# Patient Record
Sex: FEMALE | Race: WHITE | NOT HISPANIC OR LATINO
[De-identification: names, ages, dates, MRNs, and addresses within clinical notes are randomized per-mention and may not be internally consistent; named-entity substitution may affect disease eponyms.]

---

## 2018-09-21 PROBLEM — Z00.00 ENCOUNTER FOR PREVENTIVE HEALTH EXAMINATION: Status: ACTIVE | Noted: 2018-09-21

## 2018-09-24 ENCOUNTER — APPOINTMENT (OUTPATIENT)
Dept: COLORECTAL SURGERY | Facility: CLINIC | Age: 78
End: 2018-09-24
Payer: MEDICARE

## 2018-09-24 VITALS
DIASTOLIC BLOOD PRESSURE: 76 MMHG | HEIGHT: 65 IN | TEMPERATURE: 97.5 F | HEART RATE: 88 BPM | SYSTOLIC BLOOD PRESSURE: 157 MMHG | BODY MASS INDEX: 22.99 KG/M2 | WEIGHT: 138 LBS

## 2018-09-24 DIAGNOSIS — Z80.0 FAMILY HISTORY OF MALIGNANT NEOPLASM OF DIGESTIVE ORGANS: ICD-10-CM

## 2018-09-24 DIAGNOSIS — Z82.49 FAMILY HISTORY OF ISCHEMIC HEART DISEASE AND OTHER DISEASES OF THE CIRCULATORY SYSTEM: ICD-10-CM

## 2018-09-24 DIAGNOSIS — E78.00 PURE HYPERCHOLESTEROLEMIA, UNSPECIFIED: ICD-10-CM

## 2018-09-24 DIAGNOSIS — I10 ESSENTIAL (PRIMARY) HYPERTENSION: ICD-10-CM

## 2018-09-24 DIAGNOSIS — Z01.818 ENCOUNTER FOR OTHER PREPROCEDURAL EXAMINATION: ICD-10-CM

## 2018-09-24 DIAGNOSIS — C43.60 MALIGNANT MELANOMA OF UNSPECIFIED UPPER LIMB, INCLUDING SHOULDER: ICD-10-CM

## 2018-09-24 DIAGNOSIS — Z78.9 OTHER SPECIFIED HEALTH STATUS: ICD-10-CM

## 2018-09-24 PROCEDURE — 99205 OFFICE O/P NEW HI 60 MIN: CPT

## 2018-09-24 RX ORDER — SIMVASTATIN 20 MG/1
20 TABLET, FILM COATED ORAL
Refills: 0 | Status: ACTIVE | COMMUNITY

## 2018-09-24 RX ORDER — RAMIPRIL 5 MG/1
5 CAPSULE ORAL
Refills: 0 | Status: ACTIVE | COMMUNITY

## 2018-09-24 RX ORDER — METOPROLOL SUCCINATE 100 MG/1
100 TABLET, EXTENDED RELEASE ORAL
Refills: 0 | Status: ACTIVE | COMMUNITY

## 2018-09-25 LAB
ALBUMIN SERPL ELPH-MCNC: 4.8 G/DL
ALP BLD-CCNC: 84 U/L
ALT SERPL-CCNC: 25 U/L
ANION GAP SERPL CALC-SCNC: 19 MMOL/L
AST SERPL-CCNC: 37 U/L
BILIRUB SERPL-MCNC: 0.5 MG/DL
BUN SERPL-MCNC: 15 MG/DL
CALCIUM SERPL-MCNC: 10.1 MG/DL
CHLORIDE SERPL-SCNC: 102 MMOL/L
CO2 SERPL-SCNC: 24 MMOL/L
CREAT SERPL-MCNC: 0.83 MG/DL
GLUCOSE SERPL-MCNC: 29 MG/DL
POTASSIUM SERPL-SCNC: 3.8 MMOL/L
PROT SERPL-MCNC: 7.9 G/DL
SODIUM SERPL-SCNC: 145 MMOL/L

## 2018-09-27 ENCOUNTER — RESULT REVIEW (OUTPATIENT)
Age: 78
End: 2018-09-27

## 2018-09-27 ENCOUNTER — OUTPATIENT (OUTPATIENT)
Dept: OUTPATIENT SERVICES | Facility: HOSPITAL | Age: 78
LOS: 1 days | End: 2018-09-27
Payer: MEDICARE

## 2018-09-27 DIAGNOSIS — K63.5 POLYP OF COLON: ICD-10-CM

## 2018-09-27 PROCEDURE — 88321 CONSLTJ&REPRT SLD PREP ELSWR: CPT

## 2018-10-01 VITALS
OXYGEN SATURATION: 98 % | HEART RATE: 98 BPM | RESPIRATION RATE: 20 BRPM | WEIGHT: 133.82 LBS | TEMPERATURE: 96 F | HEIGHT: 65 IN | SYSTOLIC BLOOD PRESSURE: 172 MMHG | DIASTOLIC BLOOD PRESSURE: 80 MMHG

## 2018-10-01 RX ORDER — INFLUENZA VIRUS VACCINE 15; 15; 15; 15 UG/.5ML; UG/.5ML; UG/.5ML; UG/.5ML
0.5 SUSPENSION INTRAMUSCULAR ONCE
Qty: 0 | Refills: 0 | Status: DISCONTINUED | OUTPATIENT
Start: 2018-10-02 | End: 2018-10-05

## 2018-10-01 NOTE — PRE-OP CHECKLIST - WEIGHT IN KG
How Many Skin Cancers Have You Had?: more than one
What Is The Reason For Today's Visit?: History of Non-Melanoma Skin Cancer
Additional History: Patient here today for full skin exam.
When Was Your Last Cancer Diagnosed?: 2017
60.7

## 2018-10-01 NOTE — PATIENT PROFILE ADULT. - PSH
H/O melanoma excision  left arm with lymph node excision, 2016  H/O melanoma excision  right arm, 1985  H/O total hysterectomy with bilateral salpingo-oophorectomy (BSO)    History of appendectomy    History of cataract surgery  bilateral  History of cystocele  s/p repair  History of laparoscopic cholecystectomy    History of tonsillectomy

## 2018-10-01 NOTE — PATIENT PROFILE ADULT. - PMH
Colon polyp    HLD (hyperlipidemia)    HTN (hypertension)    Melanoma  both arms  Ocular migraine    Thyroid nodule

## 2018-10-02 ENCOUNTER — INPATIENT (INPATIENT)
Facility: HOSPITAL | Age: 78
LOS: 2 days | Discharge: HOME | DRG: 330 | End: 2018-10-05
Attending: SURGERY | Admitting: SURGERY
Payer: MEDICARE

## 2018-10-02 ENCOUNTER — APPOINTMENT (OUTPATIENT)
Dept: COLORECTAL SURGERY | Facility: HOSPITAL | Age: 78
End: 2018-10-02

## 2018-10-02 ENCOUNTER — RESULT REVIEW (OUTPATIENT)
Age: 78
End: 2018-10-02

## 2018-10-02 DIAGNOSIS — Z90.89 ACQUIRED ABSENCE OF OTHER ORGANS: Chronic | ICD-10-CM

## 2018-10-02 DIAGNOSIS — Z90.710 ACQUIRED ABSENCE OF BOTH CERVIX AND UTERUS: Chronic | ICD-10-CM

## 2018-10-02 DIAGNOSIS — Z98.890 OTHER SPECIFIED POSTPROCEDURAL STATES: Chronic | ICD-10-CM

## 2018-10-02 DIAGNOSIS — Z90.49 ACQUIRED ABSENCE OF OTHER SPECIFIED PARTS OF DIGESTIVE TRACT: Chronic | ICD-10-CM

## 2018-10-02 DIAGNOSIS — Z87.448 PERSONAL HISTORY OF OTHER DISEASES OF URINARY SYSTEM: Chronic | ICD-10-CM

## 2018-10-02 DIAGNOSIS — Z98.49 CATARACT EXTRACTION STATUS, UNSPECIFIED EYE: Chronic | ICD-10-CM

## 2018-10-02 LAB
APTT BLD: 29.6 SEC — SIGNIFICANT CHANGE UP (ref 27.5–37.4)
INR BLD: 1.1 — SIGNIFICANT CHANGE UP (ref 0.88–1.16)
PROTHROM AB SERPL-ACNC: 12.2 SEC — SIGNIFICANT CHANGE UP (ref 9.8–12.7)

## 2018-10-02 PROCEDURE — 44205 LAP COLECTOMY PART W/ILEUM: CPT | Mod: 80

## 2018-10-02 PROCEDURE — 44205 LAP COLECTOMY PART W/ILEUM: CPT | Mod: GC

## 2018-10-02 PROCEDURE — 49561: CPT | Mod: GC

## 2018-10-02 PROCEDURE — 49561: CPT | Mod: 80

## 2018-10-02 RX ORDER — RAMIPRIL 5 MG
1 CAPSULE ORAL
Qty: 0 | Refills: 0 | COMMUNITY

## 2018-10-02 RX ORDER — SIMVASTATIN 20 MG/1
20 TABLET, FILM COATED ORAL AT BEDTIME
Qty: 0 | Refills: 0 | Status: DISCONTINUED | OUTPATIENT
Start: 2018-10-02 | End: 2018-10-02

## 2018-10-02 RX ORDER — HEPARIN SODIUM 5000 [USP'U]/ML
5000 INJECTION INTRAVENOUS; SUBCUTANEOUS EVERY 8 HOURS
Qty: 0 | Refills: 0 | Status: DISCONTINUED | OUTPATIENT
Start: 2018-10-02 | End: 2018-10-05

## 2018-10-02 RX ORDER — KETOROLAC TROMETHAMINE 30 MG/ML
15 SYRINGE (ML) INJECTION ONCE
Qty: 0 | Refills: 0 | Status: DISCONTINUED | OUTPATIENT
Start: 2018-10-02 | End: 2018-10-02

## 2018-10-02 RX ORDER — HYDROMORPHONE HYDROCHLORIDE 2 MG/ML
0.5 INJECTION INTRAMUSCULAR; INTRAVENOUS; SUBCUTANEOUS
Qty: 0 | Refills: 0 | Status: DISCONTINUED | OUTPATIENT
Start: 2018-10-02 | End: 2018-10-02

## 2018-10-02 RX ORDER — LISINOPRIL 2.5 MG/1
20 TABLET ORAL DAILY
Qty: 0 | Refills: 0 | Status: DISCONTINUED | OUTPATIENT
Start: 2018-10-02 | End: 2018-10-05

## 2018-10-02 RX ORDER — SIMVASTATIN 20 MG/1
20 TABLET, FILM COATED ORAL AT BEDTIME
Qty: 0 | Refills: 0 | Status: DISCONTINUED | OUTPATIENT
Start: 2018-10-02 | End: 2018-10-05

## 2018-10-02 RX ORDER — SODIUM CHLORIDE 9 MG/ML
1000 INJECTION, SOLUTION INTRAVENOUS
Qty: 0 | Refills: 0 | Status: DISCONTINUED | OUTPATIENT
Start: 2018-10-02 | End: 2018-10-05

## 2018-10-02 RX ORDER — HYDROMORPHONE HYDROCHLORIDE 2 MG/ML
0.5 INJECTION INTRAMUSCULAR; INTRAVENOUS; SUBCUTANEOUS EVERY 4 HOURS
Qty: 0 | Refills: 0 | Status: DISCONTINUED | OUTPATIENT
Start: 2018-10-02 | End: 2018-10-05

## 2018-10-02 RX ORDER — ONDANSETRON 8 MG/1
4 TABLET, FILM COATED ORAL EVERY 6 HOURS
Qty: 0 | Refills: 0 | Status: DISCONTINUED | OUTPATIENT
Start: 2018-10-02 | End: 2018-10-05

## 2018-10-02 RX ORDER — ACETAMINOPHEN 500 MG
1000 TABLET ORAL ONCE
Qty: 0 | Refills: 0 | Status: COMPLETED | OUTPATIENT
Start: 2018-10-02 | End: 2018-10-02

## 2018-10-02 RX ORDER — GABAPENTIN 400 MG/1
300 CAPSULE ORAL ONCE
Qty: 0 | Refills: 0 | Status: COMPLETED | OUTPATIENT
Start: 2018-10-02 | End: 2018-10-02

## 2018-10-02 RX ORDER — LISINOPRIL 2.5 MG/1
20 TABLET ORAL DAILY
Qty: 0 | Refills: 0 | Status: DISCONTINUED | OUTPATIENT
Start: 2018-10-02 | End: 2018-10-02

## 2018-10-02 RX ORDER — CEFOTETAN DISODIUM 1 G
2 VIAL (EA) INJECTION EVERY 12 HOURS
Qty: 0 | Refills: 0 | Status: COMPLETED | OUTPATIENT
Start: 2018-10-02 | End: 2018-10-03

## 2018-10-02 RX ORDER — ACETAMINOPHEN 500 MG
650 TABLET ORAL EVERY 6 HOURS
Qty: 0 | Refills: 0 | Status: DISCONTINUED | OUTPATIENT
Start: 2018-10-02 | End: 2018-10-05

## 2018-10-02 RX ORDER — OXYCODONE AND ACETAMINOPHEN 5; 325 MG/1; MG/1
2 TABLET ORAL EVERY 6 HOURS
Qty: 0 | Refills: 0 | Status: DISCONTINUED | OUTPATIENT
Start: 2018-10-02 | End: 2018-10-05

## 2018-10-02 RX ORDER — METOPROLOL TARTRATE 50 MG
100 TABLET ORAL DAILY
Qty: 0 | Refills: 0 | Status: DISCONTINUED | OUTPATIENT
Start: 2018-10-02 | End: 2018-10-05

## 2018-10-02 RX ORDER — CHOLECALCIFEROL (VITAMIN D3) 125 MCG
1 CAPSULE ORAL
Qty: 0 | Refills: 0 | COMMUNITY

## 2018-10-02 RX ORDER — SIMVASTATIN 20 MG/1
1 TABLET, FILM COATED ORAL
Qty: 0 | Refills: 0 | COMMUNITY

## 2018-10-02 RX ORDER — METOPROLOL TARTRATE 50 MG
100 TABLET ORAL DAILY
Qty: 0 | Refills: 0 | Status: DISCONTINUED | OUTPATIENT
Start: 2018-10-02 | End: 2018-10-02

## 2018-10-02 RX ORDER — ALVIMOPAN 12 MG/1
12 CAPSULE ORAL ONCE
Qty: 0 | Refills: 0 | Status: COMPLETED | OUTPATIENT
Start: 2018-10-02 | End: 2018-10-02

## 2018-10-02 RX ORDER — CHOLECALCIFEROL (VITAMIN D3) 125 MCG
1000 CAPSULE ORAL DAILY
Qty: 0 | Refills: 0 | Status: DISCONTINUED | OUTPATIENT
Start: 2018-10-02 | End: 2018-10-05

## 2018-10-02 RX ORDER — CHOLECALCIFEROL (VITAMIN D3) 125 MCG
1000 CAPSULE ORAL DAILY
Qty: 0 | Refills: 0 | Status: DISCONTINUED | OUTPATIENT
Start: 2018-10-02 | End: 2018-10-02

## 2018-10-02 RX ORDER — HEPARIN SODIUM 5000 [USP'U]/ML
5000 INJECTION INTRAVENOUS; SUBCUTANEOUS ONCE
Qty: 0 | Refills: 0 | Status: COMPLETED | OUTPATIENT
Start: 2018-10-02 | End: 2018-10-02

## 2018-10-02 RX ORDER — KETOROLAC TROMETHAMINE 30 MG/ML
15 SYRINGE (ML) INJECTION EVERY 6 HOURS
Qty: 0 | Refills: 0 | Status: DISCONTINUED | OUTPATIENT
Start: 2018-10-02 | End: 2018-10-05

## 2018-10-02 RX ORDER — ALVIMOPAN 12 MG/1
12 CAPSULE ORAL
Qty: 0 | Refills: 0 | Status: DISCONTINUED | OUTPATIENT
Start: 2018-10-02 | End: 2018-10-05

## 2018-10-02 RX ORDER — METOPROLOL TARTRATE 50 MG
1 TABLET ORAL
Qty: 0 | Refills: 0 | COMMUNITY

## 2018-10-02 RX ADMIN — ALVIMOPAN 12 MILLIGRAM(S): 12 CAPSULE ORAL at 11:42

## 2018-10-02 RX ADMIN — Medication 15 MILLIGRAM(S): at 18:13

## 2018-10-02 RX ADMIN — HYDROMORPHONE HYDROCHLORIDE 0.5 MILLIGRAM(S): 2 INJECTION INTRAMUSCULAR; INTRAVENOUS; SUBCUTANEOUS at 18:29

## 2018-10-02 RX ADMIN — OXYCODONE AND ACETAMINOPHEN 2 TABLET(S): 5; 325 TABLET ORAL at 22:36

## 2018-10-02 RX ADMIN — OXYCODONE AND ACETAMINOPHEN 2 TABLET(S): 5; 325 TABLET ORAL at 23:06

## 2018-10-02 RX ADMIN — Medication 15 MILLIGRAM(S): at 19:07

## 2018-10-02 RX ADMIN — Medication 15 MILLIGRAM(S): at 21:42

## 2018-10-02 RX ADMIN — HEPARIN SODIUM 5000 UNIT(S): 5000 INJECTION INTRAVENOUS; SUBCUTANEOUS at 21:42

## 2018-10-02 RX ADMIN — HEPARIN SODIUM 5000 UNIT(S): 5000 INJECTION INTRAVENOUS; SUBCUTANEOUS at 11:43

## 2018-10-02 RX ADMIN — SODIUM CHLORIDE 40 MILLILITER(S): 9 INJECTION, SOLUTION INTRAVENOUS at 21:24

## 2018-10-02 RX ADMIN — Medication 400 MILLIGRAM(S): at 11:53

## 2018-10-02 RX ADMIN — GABAPENTIN 300 MILLIGRAM(S): 400 CAPSULE ORAL at 11:42

## 2018-10-02 RX ADMIN — HYDROMORPHONE HYDROCHLORIDE 0.5 MILLIGRAM(S): 2 INJECTION INTRAMUSCULAR; INTRAVENOUS; SUBCUTANEOUS at 19:07

## 2018-10-02 RX ADMIN — Medication 1000 MILLIGRAM(S): at 12:08

## 2018-10-02 NOTE — H&P ADULT - FAMILY HISTORY
Father  Still living? Unknown  Family history of colon cancer in father, Age at diagnosis: Age Unknown

## 2018-10-02 NOTE — H&P ADULT - HISTORY OF PRESENT ILLNESS
77F w/ HTN, HLD, h/o melanoma of upper arm, & colon cancer who presented to [facility] 10/2/18 for laparoscopic right hemicolectomy. Ascending colon polyp noted on colonoscopy in 2013 and has been monitored by GI at 6mo, 1y, 2y & 3yrs. Last colonoscopy 5/16/18 (Dr. Boothe) showed moderate sigmoid diverticulosis & recurrent sessile, carpeting adenoma in ascending colon measuring 8mm x 1cm. Path showed tubulovillous adenoma. CT A/P 5/8 notable for stable 6mm liver cyst. PSHx includes KALIN/BSO w/ colopexy w/ mesh for pelvic floor prolapse 1yr ago. 77F w/ HTN, HLD, h/o melanoma of upper arm, & tubulovillous adenoma who presented to [facility] 10/2/18 for laparoscopic right hemicolectomy. Ascending colon polyp noted on colonoscopy in 2013 and has been monitored by GI at 6mo, 1y, 2y & 3yrs. Last colonoscopy 5/16/18 (Dr. Boothe) showed moderate sigmoid diverticulosis & recurrent sessile, carpeting adenoma in ascending colon measuring 8mm x 1cm. Path showed tubulovillous adenoma. CT A/P 5/8 notable for stable 6mm liver cyst. PSHx includes KALIN/BSO w/ colopexy w/ mesh for pelvic floor prolapse 1yr ago. 77F w/ HTN, HLD, h/o melanoma of upper arm, & tubulovillous adenoma who presented to [facility] 10/2/18 for robot-assisted laparoscopic right hemicolectomy. Ascending colon polyp noted on colonoscopy in 2013 and has been monitored by GI at 6mo, 1y, 2y & 3yrs. Last colonoscopy 5/16/18 (Dr. Boothe) showed moderate sigmoid diverticulosis & recurrent sessile, carpeting adenoma in ascending colon measuring 8mm x 1cm. Path showed tubulovillous adenoma. CT A/P 5/8 notable for stable 6mm liver cyst. PSHx includes KALIN/BSO w/ colopexy w/ mesh for pelvic floor prolapse 1yr ago.

## 2018-10-02 NOTE — H&P ADULT - NSHPPHYSICALEXAM_GEN_ALL_CORE
NEURO: A&Ox3, NAD, LERNER.  CV: RRR, S1&S2.   PULM: CTAB, no increased WOB.  ABD: Soft, ND, NT.    EXTR: WWP. No peripheral edema.

## 2018-10-02 NOTE — BRIEF OPERATIVE NOTE - PROCEDURE
<<-----Click on this checkbox to enter Procedure Hemicolectomy, right, robot-assisted, laparoscopic  10/02/2018    Active  DIANDRA

## 2018-10-02 NOTE — H&P ADULT - ASSESSMENT
77F w/ HTN, HLD, h/o melanoma of upper arm, & tumbulovillous adenoma of ascending colon who presented to [facility] 10/2/18 for laparoscopic right hemicolectomy.     -Pain/nausea control PRN  -Home meds  -CLD, LR@40  -Entereg BID x7d  -Boswell to gravity  -Cefotetan x 2 doses post-op  -SQH, SCDs, OOB/A, IS  -AM labs

## 2018-10-02 NOTE — BRIEF OPERATIVE NOTE - OPERATION/FINDINGS
Robot-assisted laparoscopic right hemicolectomy. [In progress] Robot-assisted laparoscopic right hemicolectomy. Upon initial inspection of abdomen, noted <2cm hernia in RLQ (incarcerated omentum easily reduced) & small bowel malrotation (ie, small bowel located lateral to colon & jejunum did not cross midline). Right colon & terminal ileum mobilized medial to lateral. Vessels ligated & mesentery divided using robotic vessel sealer & monopolar electrocautery. Terminal ileum & right colon divided using 60mm robotic stapler blue loads x 2. Primary side-to-side functional end-to-end ileocolic anastomosis created using 60mm robotic stapler blue load x 1 & v-lock suture in 2 layers (full thickness followed by imbricated sutures). ICG verified good perfusion to anastomosis. Specimen removed through RLQ hernia defect. Hemostasis achieved. Fascia closed at 12mm port site & hernia defect.

## 2018-10-03 ENCOUNTER — TRANSCRIPTION ENCOUNTER (OUTPATIENT)
Age: 78
End: 2018-10-03

## 2018-10-03 LAB
ANION GAP SERPL CALC-SCNC: 16 MMOL/L — SIGNIFICANT CHANGE UP (ref 5–17)
BUN SERPL-MCNC: 19 MG/DL — SIGNIFICANT CHANGE UP (ref 7–23)
CALCIUM SERPL-MCNC: 9 MG/DL — SIGNIFICANT CHANGE UP (ref 8.4–10.5)
CHLORIDE SERPL-SCNC: 98 MMOL/L — SIGNIFICANT CHANGE UP (ref 96–108)
CO2 SERPL-SCNC: 22 MMOL/L — SIGNIFICANT CHANGE UP (ref 22–31)
CREAT SERPL-MCNC: 0.88 MG/DL — SIGNIFICANT CHANGE UP (ref 0.5–1.3)
GLUCOSE SERPL-MCNC: 124 MG/DL — HIGH (ref 70–99)
HCT VFR BLD CALC: 33.6 % — LOW (ref 34.5–45)
HGB BLD-MCNC: 11.4 G/DL — LOW (ref 11.5–15.5)
MAGNESIUM SERPL-MCNC: 1.7 MG/DL — SIGNIFICANT CHANGE UP (ref 1.6–2.6)
MCHC RBC-ENTMCNC: 31.1 PG — SIGNIFICANT CHANGE UP (ref 27–34)
MCHC RBC-ENTMCNC: 33.9 G/DL — SIGNIFICANT CHANGE UP (ref 32–36)
MCV RBC AUTO: 91.8 FL — SIGNIFICANT CHANGE UP (ref 80–100)
PHOSPHATE SERPL-MCNC: 4 MG/DL — SIGNIFICANT CHANGE UP (ref 2.5–4.5)
PLATELET # BLD AUTO: 196 K/UL — SIGNIFICANT CHANGE UP (ref 150–400)
POTASSIUM SERPL-MCNC: 3.8 MMOL/L — SIGNIFICANT CHANGE UP (ref 3.5–5.3)
POTASSIUM SERPL-SCNC: 3.8 MMOL/L — SIGNIFICANT CHANGE UP (ref 3.5–5.3)
RBC # BLD: 3.66 M/UL — LOW (ref 3.8–5.2)
RBC # FLD: 13.2 % — SIGNIFICANT CHANGE UP (ref 10.3–16.9)
SODIUM SERPL-SCNC: 136 MMOL/L — SIGNIFICANT CHANGE UP (ref 135–145)
WBC # BLD: 13.8 K/UL — HIGH (ref 3.8–10.5)
WBC # FLD AUTO: 13.8 K/UL — HIGH (ref 3.8–10.5)

## 2018-10-03 RX ORDER — POTASSIUM CHLORIDE 20 MEQ
10 PACKET (EA) ORAL
Qty: 0 | Refills: 0 | Status: COMPLETED | OUTPATIENT
Start: 2018-10-03 | End: 2018-10-03

## 2018-10-03 RX ORDER — MAGNESIUM SULFATE 500 MG/ML
1 VIAL (ML) INJECTION ONCE
Qty: 0 | Refills: 0 | Status: COMPLETED | OUTPATIENT
Start: 2018-10-03 | End: 2018-10-03

## 2018-10-03 RX ADMIN — HEPARIN SODIUM 5000 UNIT(S): 5000 INJECTION INTRAVENOUS; SUBCUTANEOUS at 22:25

## 2018-10-03 RX ADMIN — Medication 15 MILLIGRAM(S): at 23:00

## 2018-10-03 RX ADMIN — OXYCODONE AND ACETAMINOPHEN 2 TABLET(S): 5; 325 TABLET ORAL at 14:59

## 2018-10-03 RX ADMIN — ALVIMOPAN 12 MILLIGRAM(S): 12 CAPSULE ORAL at 17:02

## 2018-10-03 RX ADMIN — HEPARIN SODIUM 5000 UNIT(S): 5000 INJECTION INTRAVENOUS; SUBCUTANEOUS at 05:40

## 2018-10-03 RX ADMIN — HYDROMORPHONE HYDROCHLORIDE 0.5 MILLIGRAM(S): 2 INJECTION INTRAMUSCULAR; INTRAVENOUS; SUBCUTANEOUS at 09:56

## 2018-10-03 RX ADMIN — HEPARIN SODIUM 5000 UNIT(S): 5000 INJECTION INTRAVENOUS; SUBCUTANEOUS at 13:41

## 2018-10-03 RX ADMIN — OXYCODONE AND ACETAMINOPHEN 2 TABLET(S): 5; 325 TABLET ORAL at 14:32

## 2018-10-03 RX ADMIN — OXYCODONE AND ACETAMINOPHEN 2 TABLET(S): 5; 325 TABLET ORAL at 06:10

## 2018-10-03 RX ADMIN — Medication 15 MILLIGRAM(S): at 08:55

## 2018-10-03 RX ADMIN — SIMVASTATIN 20 MILLIGRAM(S): 20 TABLET, FILM COATED ORAL at 22:26

## 2018-10-03 RX ADMIN — HYDROMORPHONE HYDROCHLORIDE 0.5 MILLIGRAM(S): 2 INJECTION INTRAMUSCULAR; INTRAVENOUS; SUBCUTANEOUS at 02:46

## 2018-10-03 RX ADMIN — Medication 15 MILLIGRAM(S): at 22:26

## 2018-10-03 RX ADMIN — ONDANSETRON 4 MILLIGRAM(S): 8 TABLET, FILM COATED ORAL at 11:14

## 2018-10-03 RX ADMIN — Medication 15 MILLIGRAM(S): at 08:40

## 2018-10-03 RX ADMIN — HYDROMORPHONE HYDROCHLORIDE 0.5 MILLIGRAM(S): 2 INJECTION INTRAMUSCULAR; INTRAVENOUS; SUBCUTANEOUS at 23:38

## 2018-10-03 RX ADMIN — Medication 100 MILLIEQUIVALENT(S): at 11:16

## 2018-10-03 RX ADMIN — HYDROMORPHONE HYDROCHLORIDE 0.5 MILLIGRAM(S): 2 INJECTION INTRAMUSCULAR; INTRAVENOUS; SUBCUTANEOUS at 02:31

## 2018-10-03 RX ADMIN — Medication 100 MILLIEQUIVALENT(S): at 08:40

## 2018-10-03 RX ADMIN — ALVIMOPAN 12 MILLIGRAM(S): 12 CAPSULE ORAL at 05:44

## 2018-10-03 RX ADMIN — OXYCODONE AND ACETAMINOPHEN 2 TABLET(S): 5; 325 TABLET ORAL at 05:40

## 2018-10-03 RX ADMIN — Medication 100 GRAM(S): at 08:41

## 2018-10-03 RX ADMIN — Medication 100 GRAM(S): at 17:02

## 2018-10-03 RX ADMIN — HYDROMORPHONE HYDROCHLORIDE 0.5 MILLIGRAM(S): 2 INJECTION INTRAMUSCULAR; INTRAVENOUS; SUBCUTANEOUS at 16:35

## 2018-10-03 RX ADMIN — HYDROMORPHONE HYDROCHLORIDE 0.5 MILLIGRAM(S): 2 INJECTION INTRAMUSCULAR; INTRAVENOUS; SUBCUTANEOUS at 10:25

## 2018-10-03 RX ADMIN — HYDROMORPHONE HYDROCHLORIDE 0.5 MILLIGRAM(S): 2 INJECTION INTRAMUSCULAR; INTRAVENOUS; SUBCUTANEOUS at 16:16

## 2018-10-03 RX ADMIN — Medication 100 GRAM(S): at 05:40

## 2018-10-03 NOTE — DISCHARGE NOTE ADULT - CARE PLAN
Principal Discharge DX:	Colon polyp  Goal:	Recovery  Assessment and plan of treatment:	Follow up with Dr. Gotti in 1 week. Call the office at the number below to schedule your appointment. You may shower; soap and water over incision sites. Do not scrub. Pat dry when done. No tub bathing or swimming until cleared. Keep incision sites out of the sun as scars will darken. Ambulate as tolerated, but no heavy lifting (>10lbs) or strenuous exercise. You may resume regular diet. You should be urinating at least 3-4x per day. Call the office if you experience increasing abdominal pain, nausea, vomiting, or temperature >101 F.

## 2018-10-03 NOTE — DISCHARGE NOTE ADULT - ADDITIONAL INSTRUCTIONS
Take 2 tabs tylenol every 6 hours as needed for pain management. You have also been prescribed percocet for pain not controlled by tylenol. Take 2 tabs as prescribed instead of tylenol for severe pain. Take prescribed stool softener (colace) to prevent constipation caused by percocet.

## 2018-10-03 NOTE — PROGRESS NOTE ADULT - ASSESSMENT
77F w/ HTN, HLD, h/o melanoma of upper arm, & tumbulovillous adenoma of ascending colon who presented to [facility] 10/2/18 for laparoscopic right hemicolectomy.     -Pain/nausea control PRN  -Home meds  -CLD, LR@40  -Entereg BID x7d  -Boswell to gravity  -SQH, SCDs, OOB/A, IS  -AM labs

## 2018-10-03 NOTE — DISCHARGE NOTE ADULT - HOSPITAL COURSE
77F w/ HTN, HLD, h/o melanoma of upper arm, & tubulovillous adenoma of ascending colon who was admitted  to [facility] on 10/2/18 for laparoscopic right hemicolectomy. Procedure was uncomplicated and patient tolerated well. On POD1 she was tolerated CLD and noe was discontinued. On day of discharge her pain was well controlled on po medication, she was tolerating diet, voiding approriately and was stable to be d/c'd home.

## 2018-10-03 NOTE — DISCHARGE NOTE ADULT - CARE PROVIDER_API CALL
Ernesto Gotti), ColonRectal Surgery; Surgery  [facility] [address], [place] [address]  Phone: [phone]  Fax: [phone]

## 2018-10-03 NOTE — DISCHARGE NOTE ADULT - MEDICATION SUMMARY - MEDICATIONS TO TAKE
I will START or STAY ON the medications listed below when I get home from the hospital:    oxyCODONE-acetaminophen 5 mg-325 mg oral tablet  -- 2 tab(s) by mouth every 6 hours, As needed, Severe Pain (7 - 10) MDD:8  -- Indication: For Pain    ramipril 5 mg oral capsule  -- 1 cap(s) by mouth once a day  -- Indication: For HTN (hypertension)    simvastatin 20 mg oral tablet  -- 1 tab(s) by mouth once a day (at bedtime)  -- Indication: For Hyperlipidemia    Toprol- mg oral tablet, extended release  -- 1 tab(s) by mouth once a day  -- Indication: For HTN (hypertension)    alvimopan 12 mg oral capsule  -- 1 cap(s) by mouth 2 times a day  -- Indication: For Bowel motility    Multiple Vitamins oral tablet  -- 1 tab(s) by mouth once a day  -- Indication: For supplement    Vitamin D3 1000 intl units oral tablet  -- 1 tab(s) by mouth once a day  -- Indication: For supplement

## 2018-10-03 NOTE — DISCHARGE NOTE ADULT - PLAN OF CARE
Recovery Follow up with Dr. Gotti in 1 week. Call the office at the number below to schedule your appointment. You may shower; soap and water over incision sites. Do not scrub. Pat dry when done. No tub bathing or swimming until cleared. Keep incision sites out of the sun as scars will darken. Ambulate as tolerated, but no heavy lifting (>10lbs) or strenuous exercise. You may resume regular diet. You should be urinating at least 3-4x per day. Call the office if you experience increasing abdominal pain, nausea, vomiting, or temperature >101 F.

## 2018-10-03 NOTE — DISCHARGE NOTE ADULT - PATIENT PORTAL LINK FT
You can access the [facility] Patient Portal, offered by [facility], by registering with the following website: [email or web address]

## 2018-10-04 LAB
ANION GAP SERPL CALC-SCNC: 13 MMOL/L — SIGNIFICANT CHANGE UP (ref 5–17)
BUN SERPL-MCNC: 12 MG/DL — SIGNIFICANT CHANGE UP (ref 7–23)
CALCIUM SERPL-MCNC: 8.7 MG/DL — SIGNIFICANT CHANGE UP (ref 8.4–10.5)
CHLORIDE SERPL-SCNC: 91 MMOL/L — LOW (ref 96–108)
CO2 SERPL-SCNC: 23 MMOL/L — SIGNIFICANT CHANGE UP (ref 22–31)
CREAT SERPL-MCNC: 0.67 MG/DL — SIGNIFICANT CHANGE UP (ref 0.5–1.3)
GLUCOSE SERPL-MCNC: 102 MG/DL — HIGH (ref 70–99)
HCT VFR BLD CALC: 31.3 % — LOW (ref 34.5–45)
HGB BLD-MCNC: 10.6 G/DL — LOW (ref 11.5–15.5)
MAGNESIUM SERPL-MCNC: 1.8 MG/DL — SIGNIFICANT CHANGE UP (ref 1.6–2.6)
MCHC RBC-ENTMCNC: 30.8 PG — SIGNIFICANT CHANGE UP (ref 27–34)
MCHC RBC-ENTMCNC: 33.9 G/DL — SIGNIFICANT CHANGE UP (ref 32–36)
MCV RBC AUTO: 91 FL — SIGNIFICANT CHANGE UP (ref 80–100)
PHOSPHATE SERPL-MCNC: 1.7 MG/DL — LOW (ref 2.5–4.5)
PLATELET # BLD AUTO: 150 K/UL — SIGNIFICANT CHANGE UP (ref 150–400)
POTASSIUM SERPL-MCNC: 3.8 MMOL/L — SIGNIFICANT CHANGE UP (ref 3.5–5.3)
POTASSIUM SERPL-SCNC: 3.8 MMOL/L — SIGNIFICANT CHANGE UP (ref 3.5–5.3)
RBC # BLD: 3.44 M/UL — LOW (ref 3.8–5.2)
RBC # FLD: 12.9 % — SIGNIFICANT CHANGE UP (ref 10.3–16.9)
SODIUM SERPL-SCNC: 127 MMOL/L — LOW (ref 135–145)
WBC # BLD: 10.4 K/UL — SIGNIFICANT CHANGE UP (ref 3.8–10.5)
WBC # FLD AUTO: 10.4 K/UL — SIGNIFICANT CHANGE UP (ref 3.8–10.5)

## 2018-10-04 PROCEDURE — 49561: CPT | Mod: GC

## 2018-10-04 PROCEDURE — 44205 LAP COLECTOMY PART W/ILEUM: CPT | Mod: GC

## 2018-10-04 RX ORDER — MAGNESIUM SULFATE 500 MG/ML
1 VIAL (ML) INJECTION ONCE
Qty: 0 | Refills: 0 | Status: DISCONTINUED | OUTPATIENT
Start: 2018-10-04 | End: 2018-10-04

## 2018-10-04 RX ORDER — POTASSIUM PHOSPHATE, MONOBASIC POTASSIUM PHOSPHATE, DIBASIC 236; 224 MG/ML; MG/ML
15 INJECTION, SOLUTION INTRAVENOUS EVERY 6 HOURS
Qty: 0 | Refills: 0 | Status: COMPLETED | OUTPATIENT
Start: 2018-10-04 | End: 2018-10-04

## 2018-10-04 RX ORDER — MAGNESIUM SULFATE 500 MG/ML
2 VIAL (ML) INJECTION ONCE
Qty: 0 | Refills: 0 | Status: COMPLETED | OUTPATIENT
Start: 2018-10-04 | End: 2018-10-04

## 2018-10-04 RX ADMIN — Medication 15 MILLIGRAM(S): at 08:21

## 2018-10-04 RX ADMIN — Medication 1000 UNIT(S): at 12:13

## 2018-10-04 RX ADMIN — POTASSIUM PHOSPHATE, MONOBASIC POTASSIUM PHOSPHATE, DIBASIC 62.5 MILLIMOLE(S): 236; 224 INJECTION, SOLUTION INTRAVENOUS at 12:19

## 2018-10-04 RX ADMIN — Medication 15 MILLIGRAM(S): at 21:24

## 2018-10-04 RX ADMIN — HEPARIN SODIUM 5000 UNIT(S): 5000 INJECTION INTRAVENOUS; SUBCUTANEOUS at 21:24

## 2018-10-04 RX ADMIN — Medication 650 MILLIGRAM(S): at 12:13

## 2018-10-04 RX ADMIN — ALVIMOPAN 12 MILLIGRAM(S): 12 CAPSULE ORAL at 06:39

## 2018-10-04 RX ADMIN — POTASSIUM PHOSPHATE, MONOBASIC POTASSIUM PHOSPHATE, DIBASIC 62.5 MILLIMOLE(S): 236; 224 INJECTION, SOLUTION INTRAVENOUS at 18:32

## 2018-10-04 RX ADMIN — Medication 15 MILLIGRAM(S): at 07:36

## 2018-10-04 RX ADMIN — Medication 1 TABLET(S): at 12:13

## 2018-10-04 RX ADMIN — Medication 650 MILLIGRAM(S): at 12:52

## 2018-10-04 RX ADMIN — SODIUM CHLORIDE 40 MILLILITER(S): 9 INJECTION, SOLUTION INTRAVENOUS at 21:24

## 2018-10-04 RX ADMIN — Medication 100 MILLIGRAM(S): at 06:39

## 2018-10-04 RX ADMIN — Medication 15 MILLIGRAM(S): at 14:08

## 2018-10-04 RX ADMIN — Medication 15 MILLIGRAM(S): at 23:44

## 2018-10-04 RX ADMIN — HYDROMORPHONE HYDROCHLORIDE 0.5 MILLIGRAM(S): 2 INJECTION INTRAMUSCULAR; INTRAVENOUS; SUBCUTANEOUS at 00:15

## 2018-10-04 RX ADMIN — HEPARIN SODIUM 5000 UNIT(S): 5000 INJECTION INTRAVENOUS; SUBCUTANEOUS at 06:39

## 2018-10-04 RX ADMIN — LISINOPRIL 20 MILLIGRAM(S): 2.5 TABLET ORAL at 06:39

## 2018-10-04 RX ADMIN — Medication 15 MILLIGRAM(S): at 14:00

## 2018-10-04 RX ADMIN — ALVIMOPAN 12 MILLIGRAM(S): 12 CAPSULE ORAL at 18:32

## 2018-10-04 RX ADMIN — Medication 50 GRAM(S): at 09:38

## 2018-10-04 RX ADMIN — HEPARIN SODIUM 5000 UNIT(S): 5000 INJECTION INTRAVENOUS; SUBCUTANEOUS at 14:01

## 2018-10-04 NOTE — PROGRESS NOTE ADULT - ASSESSMENT
77F w/ HTN, HLD, h/o melanoma of upper arm, & tumbulovillous adenoma of ascending colon who presented to [facility] 10/2/18 for laparoscopic right hemicolectomy.     -Pain/nausea control PRN  -Home meds  -CLD, LR@40  -Entereg BID x7d  -SQH, SCDs, OOB/A, IS  -AM labs 77F w/ HTN, HLD, h/o melanoma of upper arm, & tumbulovillous adenoma of ascending colon who presented to [facility] 10/2/18 for laparoscopic right hemicolectomy, awaiting bowel function    -Pain/nausea control PRN  -Home meds  -CLD, LR@40  -Entereg BID x7d  -SQH, SCDs, OOB/A, IS  -AM labs

## 2018-10-05 VITALS
SYSTOLIC BLOOD PRESSURE: 145 MMHG | RESPIRATION RATE: 16 BRPM | TEMPERATURE: 99 F | OXYGEN SATURATION: 98 % | DIASTOLIC BLOOD PRESSURE: 79 MMHG | HEART RATE: 90 BPM

## 2018-10-05 LAB
ANION GAP SERPL CALC-SCNC: 11 MMOL/L — SIGNIFICANT CHANGE UP (ref 5–17)
BUN SERPL-MCNC: 8 MG/DL — SIGNIFICANT CHANGE UP (ref 7–23)
CALCIUM SERPL-MCNC: 9.6 MG/DL — SIGNIFICANT CHANGE UP (ref 8.4–10.5)
CHLORIDE SERPL-SCNC: 100 MMOL/L — SIGNIFICANT CHANGE UP (ref 96–108)
CO2 SERPL-SCNC: 25 MMOL/L — SIGNIFICANT CHANGE UP (ref 22–31)
CREAT SERPL-MCNC: 0.69 MG/DL — SIGNIFICANT CHANGE UP (ref 0.5–1.3)
GLUCOSE SERPL-MCNC: 97 MG/DL — SIGNIFICANT CHANGE UP (ref 70–99)
HCT VFR BLD CALC: 35.1 % — SIGNIFICANT CHANGE UP (ref 34.5–45)
HGB BLD-MCNC: 12.1 G/DL — SIGNIFICANT CHANGE UP (ref 11.5–15.5)
MAGNESIUM SERPL-MCNC: 2.3 MG/DL — SIGNIFICANT CHANGE UP (ref 1.6–2.6)
MCHC RBC-ENTMCNC: 31.3 PG — SIGNIFICANT CHANGE UP (ref 27–34)
MCHC RBC-ENTMCNC: 34.5 G/DL — SIGNIFICANT CHANGE UP (ref 32–36)
MCV RBC AUTO: 90.7 FL — SIGNIFICANT CHANGE UP (ref 80–100)
PHOSPHATE SERPL-MCNC: 2.1 MG/DL — LOW (ref 2.5–4.5)
PLATELET # BLD AUTO: 217 K/UL — SIGNIFICANT CHANGE UP (ref 150–400)
POTASSIUM SERPL-MCNC: 3.8 MMOL/L — SIGNIFICANT CHANGE UP (ref 3.5–5.3)
POTASSIUM SERPL-SCNC: 3.8 MMOL/L — SIGNIFICANT CHANGE UP (ref 3.5–5.3)
RBC # BLD: 3.87 M/UL — SIGNIFICANT CHANGE UP (ref 3.8–5.2)
RBC # FLD: 13 % — SIGNIFICANT CHANGE UP (ref 10.3–16.9)
SODIUM SERPL-SCNC: 136 MMOL/L — SIGNIFICANT CHANGE UP (ref 135–145)
WBC # BLD: 10.5 K/UL — SIGNIFICANT CHANGE UP (ref 3.8–10.5)
WBC # FLD AUTO: 10.5 K/UL — SIGNIFICANT CHANGE UP (ref 3.8–10.5)

## 2018-10-05 RX ORDER — SODIUM,POTASSIUM PHOSPHATES 278-250MG
2 POWDER IN PACKET (EA) ORAL ONCE
Qty: 0 | Refills: 0 | Status: COMPLETED | OUTPATIENT
Start: 2018-10-05 | End: 2018-10-05

## 2018-10-05 RX ORDER — ALVIMOPAN 12 MG/1
1 CAPSULE ORAL
Qty: 8 | Refills: 0 | OUTPATIENT
Start: 2018-10-05 | End: 2018-10-08

## 2018-10-05 RX ADMIN — Medication 1000 UNIT(S): at 11:34

## 2018-10-05 RX ADMIN — Medication 1 TABLET(S): at 11:23

## 2018-10-05 RX ADMIN — Medication 650 MILLIGRAM(S): at 00:23

## 2018-10-05 RX ADMIN — Medication 2 PACKET(S): at 11:23

## 2018-10-05 RX ADMIN — ALVIMOPAN 12 MILLIGRAM(S): 12 CAPSULE ORAL at 06:14

## 2018-10-05 RX ADMIN — Medication 100 MILLIGRAM(S): at 06:14

## 2018-10-05 RX ADMIN — HEPARIN SODIUM 5000 UNIT(S): 5000 INJECTION INTRAVENOUS; SUBCUTANEOUS at 06:14

## 2018-10-05 RX ADMIN — Medication 650 MILLIGRAM(S): at 02:15

## 2018-10-05 RX ADMIN — LISINOPRIL 20 MILLIGRAM(S): 2.5 TABLET ORAL at 06:14

## 2018-10-05 RX ADMIN — Medication 15 MILLIGRAM(S): at 11:23

## 2018-10-05 NOTE — PROGRESS NOTE ADULT - REASON FOR ADMISSION
Tubulovillous adenoma

## 2018-10-05 NOTE — PROGRESS NOTE ADULT - ASSESSMENT
77F w/ HTN, HLD, h/o melanoma of upper arm, & tumbulovillous adenoma of ascending colon who presented to [facility] 10/2/18 for laparoscopic right hemicolectomy.     -Pain/nausea control PRN  -Home meds  -Low Residue Diet  -Entereg BID x7d  -SQH, SCDs, OOB/A, IS

## 2018-10-05 NOTE — PROGRESS NOTE ADULT - SUBJECTIVE AND OBJECTIVE BOX
STATUS POST:  Hemicolectomy, right, robot-assisted, laparoscopic  Hemicolectomy, right, robot-assisted, laparoscopic      POST OPERATIVE DAY #: 3    SUBJECTIVE:   Patient feels well. Complains of some cramping and sensation of bowel activity  Tolerating CLD.  Patient is having bowel movements and is passing gas. One episode of bowel incontinence    ---------------------------------------------------------------    Vital Signs Last 24 Hrs  T(C): 37.1 (05 Oct 2018 05:49), Max: 37.4 (04 Oct 2018 08:26)  T(F): 98.7 (05 Oct 2018 05:49), Max: 99.3 (04 Oct 2018 08:26)  HR: 91 (05 Oct 2018 05:49) (79 - 91)  BP: 114/66 (05 Oct 2018 05:49) (101/67 - 134/80)  BP(mean): --  RR: 16 (05 Oct 2018 05:49) (16 - 18)  SpO2: 96% (05 Oct 2018 05:49) (95% - 97%)    I&O's Summary    04 Oct 2018 07:01  -  05 Oct 2018 07:00  --------------------------------------------------------  IN: 1560 mL / OUT: 910 mL / NET: 650 mL        ----------------------------------------------------------------    Physical Exam:  General: NAD, OOB in chair  Neuro: A&Ox3  Respiratory: nonlabored breathing, no respiratory distress   Abd: soft, incisional tenderness, nondistended,   incisions clean dry intact with some surrounding echymossis, no hematoma, without erythema, drainage, or malodor   Extremities: WWP, nonedematous    -------------------------------------------------------------------    LABS:                        10.6   10.4  )-----------( 150      ( 04 Oct 2018 08:29 )             31.3     10-04    127<L>  |  91<L>  |  12  ----------------------------<  102<H>  3.8   |  23  |  0.67    Ca    8.7      04 Oct 2018 08:29  Phos  1.7     10-04  Mg     1.8     10-04              RADIOLOGY & ADDITIONAL STUDIES:
STATUS POST:  Hemicolectomy, right, robot-assisted, laparoscopic  Hemicolectomy, right, robot-assisted, laparoscopic    POST OPERATIVE DAY #: 2    SUBJECTIVE:   Overnight, patient passed trial of void with 600 cc total.   OOB  occasional nausea with pain meds. Some incisional pain.  Denies flatus    ---------------------------------------------------------------    Vital Signs Last 24 Hrs  T(C): 37.1 (04 Oct 2018 05:46), Max: 37.8 (03 Oct 2018 08:59)  T(F): 98.7 (04 Oct 2018 05:46), Max: 100 (03 Oct 2018 08:59)  HR: 101 (04 Oct 2018 05:46) (80 - 999)  BP: 111/61 (04 Oct 2018 05:46) (90/52 - 119/71)  BP(mean): --  RR: 17 (04 Oct 2018 05:46) (16 - 17)  SpO2: 95% (04 Oct 2018 05:46) (95% - 98%)    I&O's Summary    03 Oct 2018 07:01  -  04 Oct 2018 07:00  --------------------------------------------------------  IN: 840 mL / OUT: 800 mL / NET: 40 mL        ----------------------------------------------------------------    Physical Exam:  General: NAD, OOB in chair  Neuro: A&Ox3  Respiratory: nonlabored breathing, no respiratory distress   Abd: soft, incisional tenderness, nondistended,   incisions clean dry intact with some surrounding echymossis, no hematoma, without erythema, drainage, or malodor   Extremities: WWP, nonedematous    -------------------------------------------------------------------    LABS:                        11.4   13.8  )-----------( 196      ( 03 Oct 2018 05:55 )             33.6     10-03    136  |  98  |  19  ----------------------------<  124<H>  3.8   |  22  |  0.88    Ca    9.0      03 Oct 2018 05:55  Phos  4.0     10-03  Mg     1.7     10-03      PT/INR - ( 02 Oct 2018 12:33 )   PT: 12.2 sec;   INR: 1.10          PTT - ( 02 Oct 2018 12:33 )  PTT:29.6 sec        RADIOLOGY & ADDITIONAL STUDIES:
Attending Surgeon Progress Note  STATUS POST:  robot right hemicolectomy  POST OPERATIVE DAY #:   2  SUBJECTIVE:  Flatus: no  Bowel movement: no  Pain controlled: YES     PHYSICAL EXAM:  Alert, oriented  Lungs:  CTA bilaterally, good inspiratory effort  Cor:  RRR  Abdomen:  soft, nondistended, nontender/incisional tenderness, +bowel sounds, incision/dressing clean dry, intact with no erythema  Ext:  no edema, well-perfused
Attending Surgeon Progress Note  STATUS POST:  robotic right colectomy  POST OPERATIVE DAY #: 3    SUBJECTIVE:  Flatus: yes	  Bowel movement: yes  Pain controlled: YES     PHYSICAL EXAM:  Alert, oriented  Lungs:  CTA bilaterally, good inspiratory effort  Cor:  RRR  Abdomen:  soft, nondistended, nontender/incisional tenderness, +bowel sounds, incision/dressing clean dry, intact with no erythema  Ext:  no edema, well-perfused
POST-OPERATIVE NOTE    Procedure: Robot-assisted, laparoscopic right hemicolectomy    Diagnosis/Indication: Tubulovillous adenoma of colon     Surgeon: Dr. Gotti    S: Pt has no complaints. Denies CP, SOB, VALENCIA, calf tenderness. Pain controlled with medication. Denies nausea, vomiting.    O:  T(C): 36.5 (10-02-18 @ 17:56), Max: 36.5 (10-02-18 @ 17:56)  T(F): 97.7 (10-02-18 @ 17:56), Max: 97.7 (10-02-18 @ 17:56)  HR: 82 (10-02-18 @ 20:00) (76 - 90)  BP: 118/48 (10-02-18 @ 20:00) (94/49 - 153/64)  RR: 15 (10-02-18 @ 20:00) (11 - 28)  SpO2: 96% (10-02-18 @ 20:00) (96% - 100%)  Wt(kg): --    Gen: NAD, resting comfortably in bed  C/V: NSR  Pulm: Nonlabored breathing, no respiratory distress  Abd: soft, NT/ND, incision areas is clean, dry and intant  Extrem: WWP, no calf edema or tenderness, SCDs in place    A/P: 77y Female s/p above procedure  Diet: CLD  IVF: LR @ 40  Boswell  Pain/nausea control  SQH/SCDs/OOBA/IS  Dispo pending pain control, PO tolerance, clinical improvement
SUBJECTIVE: Patient doing well, pain controlled, awaiting bowel function. Patient seen and examined bedside by chief resident.    cefoTEtan  IVPB 2 Gram(s) IV Intermittent every 12 hours  heparin  Injectable 5000 Unit(s) SubCutaneous every 8 hours  lisinopril 20 milliGRAM(s) Oral daily  metoprolol succinate  milliGRAM(s) Oral daily      Vital Signs Last 24 Hrs  T(C): 36.7 (03 Oct 2018 05:28), Max: 36.7 (03 Oct 2018 05:28)  T(F): 98 (03 Oct 2018 05:28), Max: 98 (03 Oct 2018 05:28)  HR: 84 (03 Oct 2018 05:28) (76 - 90)  BP: 95/58 (03 Oct 2018 05:28) (94/49 - 153/64)  BP(mean): 108 (02 Oct 2018 20:30) (66 - 108)  RR: 17 (03 Oct 2018 05:28) (11 - 28)  SpO2: 97% (03 Oct 2018 05:28) (95% - 100%)  I&O's Detail    02 Oct 2018 07:01  -  03 Oct 2018 07:00  --------------------------------------------------------  IN:    lactated ringers.: 3020 mL  Total IN: 3020 mL    OUT:    Estimated Blood Loss: 100 mL    Intermittent Catheterization - Urethral: 320 mL  Total OUT: 420 mL    Total NET: 2600 mL          General: NAD, resting comfortably in bed  C/V: NSR  Pulm: Nonlabored breathing, no respiratory distress  Abd: soft, nontender, nondistended, incisions CDI, small ecchymosis surrounding incisions  Extrem:  SCDs in place        LABS:                        11.4   13.8  )-----------( 196      ( 03 Oct 2018 05:55 )             33.6     10-03    136  |  98  |  19  ----------------------------<  124<H>  3.8   |  22  |  0.88    Ca    9.0      03 Oct 2018 05:55  Phos  4.0     10-03  Mg     1.7     10-03      PT/INR - ( 02 Oct 2018 12:33 )   PT: 12.2 sec;   INR: 1.10          PTT - ( 02 Oct 2018 12:33 )  PTT:29.6 sec

## 2018-10-08 PROBLEM — C43.9 MALIGNANT MELANOMA OF SKIN, UNSPECIFIED: Chronic | Status: ACTIVE | Noted: 2018-10-01

## 2018-10-08 PROBLEM — E78.5 HYPERLIPIDEMIA, UNSPECIFIED: Chronic | Status: ACTIVE | Noted: 2018-10-01

## 2018-10-08 PROBLEM — K63.5 POLYP OF COLON: Chronic | Status: ACTIVE | Noted: 2018-10-01

## 2018-10-08 PROBLEM — G43.109 MIGRAINE WITH AURA, NOT INTRACTABLE, WITHOUT STATUS MIGRAINOSUS: Chronic | Status: ACTIVE | Noted: 2018-10-01

## 2018-10-08 PROBLEM — E04.1 NONTOXIC SINGLE THYROID NODULE: Chronic | Status: ACTIVE | Noted: 2018-10-01

## 2018-10-08 PROBLEM — I10 ESSENTIAL (PRIMARY) HYPERTENSION: Chronic | Status: ACTIVE | Noted: 2018-10-01

## 2018-10-09 DIAGNOSIS — Z85.820 PERSONAL HISTORY OF MALIGNANT MELANOMA OF SKIN: ICD-10-CM

## 2018-10-09 DIAGNOSIS — D12.2 BENIGN NEOPLASM OF ASCENDING COLON: ICD-10-CM

## 2018-10-09 DIAGNOSIS — Z90.722 ACQUIRED ABSENCE OF OVARIES, BILATERAL: ICD-10-CM

## 2018-10-09 DIAGNOSIS — Z88.0 ALLERGY STATUS TO PENICILLIN: ICD-10-CM

## 2018-10-09 DIAGNOSIS — K43.6 OTHER AND UNSPECIFIED VENTRAL HERNIA WITH OBSTRUCTION, WITHOUT GANGRENE: ICD-10-CM

## 2018-10-09 DIAGNOSIS — Q43.3 CONGENITAL MALFORMATIONS OF INTESTINAL FIXATION: ICD-10-CM

## 2018-10-09 DIAGNOSIS — G43.809 OTHER MIGRAINE, NOT INTRACTABLE, WITHOUT STATUS MIGRAINOSUS: ICD-10-CM

## 2018-10-09 DIAGNOSIS — Z90.710 ACQUIRED ABSENCE OF BOTH CERVIX AND UTERUS: ICD-10-CM

## 2018-10-09 DIAGNOSIS — K76.89 OTHER SPECIFIED DISEASES OF LIVER: ICD-10-CM

## 2018-10-09 DIAGNOSIS — E78.5 HYPERLIPIDEMIA, UNSPECIFIED: ICD-10-CM

## 2018-10-09 DIAGNOSIS — I10 ESSENTIAL (PRIMARY) HYPERTENSION: ICD-10-CM

## 2018-10-09 DIAGNOSIS — Z88.1 ALLERGY STATUS TO OTHER ANTIBIOTIC AGENTS STATUS: ICD-10-CM

## 2018-10-19 ENCOUNTER — APPOINTMENT (OUTPATIENT)
Dept: COLORECTAL SURGERY | Facility: CLINIC | Age: 78
End: 2018-10-19
Payer: MEDICARE

## 2018-10-19 VITALS
BODY MASS INDEX: 21.66 KG/M2 | WEIGHT: 130 LBS | DIASTOLIC BLOOD PRESSURE: 73 MMHG | HEART RATE: 85 BPM | TEMPERATURE: 97.3 F | HEIGHT: 65 IN | SYSTOLIC BLOOD PRESSURE: 124 MMHG

## 2018-10-19 DIAGNOSIS — K63.5 POLYP OF COLON: ICD-10-CM

## 2018-10-19 PROCEDURE — 99024 POSTOP FOLLOW-UP VISIT: CPT

## 2018-10-19 RX ORDER — NEOMYCIN SULFATE 500 MG/1
500 TABLET ORAL
Qty: 6 | Refills: 0 | Status: DISCONTINUED | COMMUNITY
Start: 2018-09-24 | End: 2018-10-19

## 2018-10-19 RX ORDER — METRONIDAZOLE 500 MG/1
500 TABLET ORAL
Qty: 6 | Refills: 0 | Status: DISCONTINUED | COMMUNITY
Start: 2018-09-24 | End: 2018-10-19

## 2018-11-11 PROCEDURE — 85027 COMPLETE CBC AUTOMATED: CPT

## 2018-11-11 PROCEDURE — 82962 GLUCOSE BLOOD TEST: CPT

## 2018-11-11 PROCEDURE — 36415 COLL VENOUS BLD VENIPUNCTURE: CPT

## 2018-11-11 PROCEDURE — 85610 PROTHROMBIN TIME: CPT

## 2018-11-11 PROCEDURE — 86901 BLOOD TYPING SEROLOGIC RH(D): CPT

## 2018-11-11 PROCEDURE — 88302 TISSUE EXAM BY PATHOLOGIST: CPT

## 2018-11-11 PROCEDURE — 85730 THROMBOPLASTIN TIME PARTIAL: CPT

## 2018-11-11 PROCEDURE — S2900: CPT

## 2018-11-11 PROCEDURE — 83735 ASSAY OF MAGNESIUM: CPT

## 2018-11-11 PROCEDURE — 84100 ASSAY OF PHOSPHORUS: CPT

## 2018-11-11 PROCEDURE — 88309 TISSUE EXAM BY PATHOLOGIST: CPT

## 2018-11-11 PROCEDURE — 80048 BASIC METABOLIC PNL TOTAL CA: CPT

## 2018-11-11 PROCEDURE — 86850 RBC ANTIBODY SCREEN: CPT

## 2018-11-11 PROCEDURE — 86900 BLOOD TYPING SEROLOGIC ABO: CPT

## 2018-11-11 PROCEDURE — C9399: CPT

## 2023-01-20 NOTE — PRE-OP CHECKLIST - HEIGHT IN INCHES
[facility] Outpatient Program  Group Therapy    Date of Service: 1/20/2023  Start time: 1100  Stop time: 1150  Type of session: Therapy Group    Problem number: 1Dep. F33.0    Short term goal (STG): L Pt will identify one core belief or message that she holds that she recognizes fuels anxiety and/or worry and share with group and how the beliefs impact her.        Intervention/techniques: Informed, Validated/Supported, Listened/Empathized, Observed/Monitored, Clarified, Reinforced, Provided Feedback, and Problem Solved    Patient mental status/affect: Anxious, Congruent, and Preoccupied    Patient behavior/appearance: Attentive, Cooperative, and Motivated    Special patient treatment accommodations provided (describe): None    Patient response and progress towards goals: Focus of session was on article titled Developing a Positive Mental Attitude.    We spoke about what needs to happen in order to develop a PMA and that includes changing the language to be more empowering and how simple changes to the words we use can impact our attitude. We spoke about the skill of shifting your physiology and how we can focus on breathing and tension in the body and how our body's physiology changes based on what emotions that we are feeling. We spoke about making self to self comparisons and how we can help ourselves more when we focus on developing a stronger self. We discussed cultivating present moment awareness and asking effective questions such as what's great about this or what opportunities exist here?   We spoke about what skills group members can develop. Pt participated int he group activity and engaged with the other members. She shared a lot about her lack of motivation at times and how that sets her back. She has been non-active a lot lately and feels that has impacted her sleep. She states she tries to turn to gratefulness when she is unmotivated. 5

## 2023-07-18 NOTE — PATIENT PROFILE ADULT. - HEALTH/HEALTHCARE ANXIETIES, PROFILE
Rifampin Counseling: I discussed with the patient the risks of rifampin including but not limited to liver damage, kidney damage, red-orange body fluids, nausea/vomiting and severe allergy. surgery/anesthesia

## 2025-07-11 NOTE — PATIENT PROFILE ADULT. - FUNCTIONAL SCREEN CURRENT LEVEL: TOILETING, MLM
Still on esmerol, will step down after the med is d/c'ed.     07/11/25 1406   Rounds   Attendance Provider;Nurse ;;Charge nurse;Physical therapist   Discharge Plan A Home   Why the patient remains in the hospital Requires continued medical care   Transition of Care Barriers None     Mark Santana, RN,BSN       (0) independent